# Patient Record
Sex: FEMALE | Race: WHITE | NOT HISPANIC OR LATINO | Employment: UNEMPLOYED | URBAN - METROPOLITAN AREA
[De-identification: names, ages, dates, MRNs, and addresses within clinical notes are randomized per-mention and may not be internally consistent; named-entity substitution may affect disease eponyms.]

---

## 2019-05-06 PROCEDURE — 86481 TB AG RESPONSE T-CELL SUSP: CPT

## 2019-05-07 ENCOUNTER — LAB REQUISITION (OUTPATIENT)
Dept: LAB | Facility: HOSPITAL | Age: 54
End: 2019-05-07

## 2019-05-07 DIAGNOSIS — Z00.00 ROUTINE GENERAL MEDICAL EXAMINATION AT A HEALTH CARE FACILITY: ICD-10-CM

## 2019-05-08 LAB
TSPOT INTERPRETATION: NEGATIVE
TSPOT NIL CONTROL INTERPRETATION: NORMAL
TSPOT PANEL A: 0
TSPOT PANEL B: 0
TSPOT POS CONTROL INTERPRETATION: NORMAL

## 2021-03-19 ENCOUNTER — TRANSCRIBE ORDERS (OUTPATIENT)
Dept: ADMINISTRATIVE | Facility: HOSPITAL | Age: 56
End: 2021-03-19

## 2021-03-19 DIAGNOSIS — Z01.818 PRE-OPERATIVE CLEARANCE: Primary | ICD-10-CM

## 2021-03-22 ENCOUNTER — LAB (OUTPATIENT)
Dept: LAB | Facility: HOSPITAL | Age: 56
End: 2021-03-22

## 2021-03-22 DIAGNOSIS — Z01.818 PRE-OPERATIVE CLEARANCE: ICD-10-CM

## 2021-03-22 PROCEDURE — U0004 COV-19 TEST NON-CDC HGH THRU: HCPCS | Performed by: SURGERY

## 2021-03-22 PROCEDURE — C9803 HOPD COVID-19 SPEC COLLECT: HCPCS

## 2021-03-23 LAB — SARS-COV-2 RNA NOSE QL NAA+PROBE: NOT DETECTED

## 2021-04-02 ENCOUNTER — TRANSCRIBE ORDERS (OUTPATIENT)
Dept: ADMINISTRATIVE | Facility: HOSPITAL | Age: 56
End: 2021-04-02

## 2021-04-02 DIAGNOSIS — Z01.818 OTHER SPECIFIED PRE-OPERATIVE EXAMINATION: Primary | ICD-10-CM

## 2021-04-05 ENCOUNTER — LAB (OUTPATIENT)
Dept: LAB | Facility: HOSPITAL | Age: 56
End: 2021-04-05

## 2021-04-05 DIAGNOSIS — Z01.818 OTHER SPECIFIED PRE-OPERATIVE EXAMINATION: ICD-10-CM

## 2021-04-05 PROCEDURE — C9803 HOPD COVID-19 SPEC COLLECT: HCPCS

## 2021-04-05 PROCEDURE — U0004 COV-19 TEST NON-CDC HGH THRU: HCPCS | Performed by: SURGERY

## 2021-04-06 LAB — SARS-COV-2 RNA NOSE QL NAA+PROBE: NOT DETECTED

## 2022-01-12 ENCOUNTER — CONSULT (OUTPATIENT)
Dept: RADIATION ONCOLOGY | Facility: HOSPITAL | Age: 57
End: 2022-01-12

## 2022-01-12 ENCOUNTER — OFFICE VISIT (OUTPATIENT)
Dept: RADIATION ONCOLOGY | Facility: HOSPITAL | Age: 57
End: 2022-01-12

## 2022-01-12 DIAGNOSIS — C79.31 BRAIN METASTASIS: Primary | ICD-10-CM

## 2022-01-12 PROCEDURE — 99204 OFFICE O/P NEW MOD 45 MIN: CPT | Performed by: RADIOLOGY

## 2022-01-12 PROCEDURE — G0463 HOSPITAL OUTPT CLINIC VISIT: HCPCS

## 2022-01-12 NOTE — PROGRESS NOTES
New Patient Office Consult      Patient Name: Maria Del Rosario Carrasco  : 1965   MRN: [id number]     Requesting Physician: Gary Johnson MD    Chief Complaint:  Brain Metastasis   Staging: IV    History of Present Illness: Maria Del Rosario Carrasco is a pleasant 56 y.o. female who is here today for consultation regarding radiation therapy to the whole brain.  She is accompanied today by her son.    Ms. Carrasco originally had presented with abdominal pain.  In 2021 a CT of the abdomen pelvis revealed unusual multiloculated lesions in the left lobe of the liver, with multiple lung nodules.  Her PET CT on 3/22/2021 revealed bilateral hypermetabolic pulmonary nodules which increased in size from the previous exam.  There was a 15 mm left upper lobe nodule, right lower lobe nodule measuring 14 mm, intense hypermetabolism in the lateral segment of the left lobe of the liver with a liver mass, and hypermetabolism in the medial segment of the left lobe of the liver.  After this on 3/30/2021 the patient underwent ultrasound-guided liver biopsy which pathology revealed to be assistant with adenocarcinoma CK7 positive TTF-1 negative suggestive of cholangiocarcinoma, pancreatic versus upper GI.  Imaging after this date continued to have progression of disease.  However, an MRI on 2022 revealed interval development of at least 3 foci of abnormal enhancement worrisome for metastatic disease, the ring-enhancing lesion in the left axillary occipital lobe measured 7 mm, anterior margin of the right frontal lobe measured 5 mm, and another focus in the medial left temporal lobe with multiple other small foci of enhancement.  Previous MRIs up to this point had not shown any worrisome areas.    She is currently been seeing Dr. Johnson at [facility] for her cancer care up to this point.  So far she has underwent cisplatin from 5/3/2021, and was discontinued on 2021.  She received FOLFOX from 2021, and was discontinued  on 9/29/2021 due to disease progression.  She had also received FOLFIRI from 11/4/2021, and this was discontinued on 1/3/2022.    Today she has no neurological complaints.  She states that she has occasional headaches, but other than that no memory loss, syncope, seizures, or confusion.  She does have weakness and abdominal distention.  She uses a wheelchair, but is able to walk short distances.      Subjective      Review of Systems:   Review of Systems   HENT: Negative.    Eyes: Negative.    Respiratory: Negative.    Cardiovascular: Negative.    Gastrointestinal: Positive for abdominal distention.   Endocrine: Negative.    Genitourinary: Negative.    Musculoskeletal: Negative.    Skin: Negative.  Negative for color change and dry skin.   Neurological: Positive for weakness and headache. Negative for syncope, memory problem and confusion.   Hematological: Negative.    Psychiatric/Behavioral: Negative.      Review of Systems   HENT:  Negative.    Eyes: Negative.    Respiratory: Negative.    Cardiovascular: Negative.    Gastrointestinal: Positive for abdominal distention.   Endocrine: Negative.    Genitourinary: Negative.     Musculoskeletal: Negative.    Skin: Negative.  Negative for color change.   Neurological: Positive for weakness. Negative for syncope.   Hematological: Negative.    Psychiatric/Behavioral: Negative.  Negative for confusion.       I have reviewed and confirmed the accuracy of the ROS as documented by the MA/LPN/RN BEBO Funez     Past Oncology History:   Oncology/Hematology History    No history exists.        Past Radiation History:  No previous exposures to ionizing radiation therapy and there are not relative contraindications including connective tissue disorder.     Past Medical History:   Past Medical History:   Diagnosis Date   • Diabetes mellitus (HCC)    • Diverticulitis    • Fractures    • Pancreatic cancer (HCC)        Past Surgical History: History reviewed. No  pertinent surgical history.    Family History:   Family History   Problem Relation Age of Onset   • Cancer Father    • Cancer Paternal Uncle        Social History:   Social History     Socioeconomic History   • Marital status:    Tobacco Use   • Smoking status: Never Smoker   • Smokeless tobacco: Never Used   Substance and Sexual Activity   • Alcohol use: Never   • Drug use: Never       Medications:   No current outpatient medications on file.    Allergies:   Allergies   Allergen Reactions   • Codeine Rash   • Keflex [Cephalexin] Rash   • Wellbutrin [Bupropion] Rash       KPS: 50%     Results Review:   The following data was reviewed by: BEBO Funez on 01/12/2022:    Imaging:    PET/CT-3/22/2021          MRI brain with and without contrast-1/4/2022        Pathology:    3/3/2021        Objective     Physical Exam:  Physical Exam  Vitals reviewed.   Constitutional:       General: She is not in acute distress.     Appearance: Normal appearance. She is normal weight. She is not ill-appearing.   HENT:      Head: Normocephalic.      Nose: Nose normal.      Mouth/Throat:      Mouth: Mucous membranes are moist.      Pharynx: Oropharynx is clear.   Eyes:      Extraocular Movements: Extraocular movements intact.      Conjunctiva/sclera: Conjunctivae normal.      Pupils: Pupils are equal, round, and reactive to light.   Cardiovascular:      Rate and Rhythm: Normal rate and regular rhythm.      Pulses: Normal pulses.      Heart sounds: Normal heart sounds.   Pulmonary:      Effort: Pulmonary effort is normal. No respiratory distress.      Breath sounds: Normal breath sounds.   Abdominal:      General: Bowel sounds are normal. There is distension.      Palpations: Abdomen is soft. There is no mass.   Musculoskeletal:         General: No swelling or tenderness. Normal range of motion.      Cervical back: Normal range of motion.   Skin:     General: Skin is warm and dry.      Capillary Refill: Capillary  refill takes less than 2 seconds.   Neurological:      General: No focal deficit present.      Mental Status: She is alert and oriented to person, place, and time. Mental status is at baseline.   Psychiatric:         Mood and Affect: Mood normal.         Behavior: Behavior normal.         Thought Content: Thought content normal.         Judgment: Judgment normal.         Vital Signs: There were no vitals filed for this visit.  There is no height or weight on file to calculate BMI.       Assessment / Plan      Assessment/Plan:   Ms. 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    She had a CT scan on December 26, 2021 reveals and reports some pulm metastatic disease around T4 level.    She is about to start palliative radiation therapy to T3-T5 with a 30 Gray in 10 fractions as planned by Dr. Diez at [facility].    MRI of the brain on January 4, 2022 showed a ring-enhancing lesion in the left occipital lobe measuring 7 mm. There is a 5 mm focus of enhancement along the anterior margin of the right frontal lobe.  There is a tiny focus of enhancement in the medial left temporal lobe.  Interval development of at least 3 foci of abnormal enhancement worrisome for metastatic disease in the brain. Patient is not having any neurological symptoms yet.    I have recommended SRS to the 3 lesions in the brain with [facility] at [facility], due to the possible toxicity from the whole brain radiation therapy.    Patient is willing to travel to [place] (her son lives there) and we will make corresponding arrangements for her to be seen at [facility].    I, Joe Renteria MD, personally performed the services described in this documentation as scribed by the above named individual in my presence, and it is both accurate and complete.  1/12/2022  10:12 EST     Electronically signed by Joe Renteria MD, 01/12/22,  10:12 AM EST.    Follow Up:   None at this time     Scribed for Dr. Joe Renteria MD by Cleopatra Ramírez, APRN 1/12/2022  09:51 EST

## 2022-01-12 NOTE — PROGRESS NOTES
This is a 56-year-old lady who was diagnosed in April 2021 with a metastatic stage IV adenocarcinoma of pancreatic or  hepatobiliary origin.    She has a CT scan on December 26, 2021 reveals and reports some pulm metastatic disease around T4 level with    She is about to start palliative radiation therapy to T3-T5 with a 30 Gray in 10 fractions as planned by Dr. Diez at [facility].    MRI of the brain on January 4, 2022 showed a ring-enhancing lesion in the left occipital lobe measuring 7 mm    There is a 5 mm focus of enhancement along the anterior margin of the right frontal lobe.  There is a tiny focus of enhancement in the medial left temporal lobe.  Interval development of at least 3 foci of abnormal enhancement worrisome for metastatic disease in the brain    Patient is not having any neurological symptoms yet    I have recommended SRS to the 3 lesions in the brain with [facility] at [facility].  Her toxicity from the whole brain radiation therapy.    Patient is willing to travel to [place] and we had made corresponding arrangements for her to be seen at [facility].

## 2022-01-18 ENCOUNTER — TELEPHONE (OUTPATIENT)
Dept: RADIATION ONCOLOGY | Facility: HOSPITAL | Age: 57
End: 2022-01-18

## 2022-01-18 NOTE — TELEPHONE ENCOUNTER
Radiation Oncology appointment and outside film reminder-Attemted to call the patient, there was no answer, I left a voice mail with the appointment details and a reminder on the importance of bringing outside films to consult-AAKASH Joyner

## 2022-01-20 ENCOUNTER — HOSPITAL ENCOUNTER (OUTPATIENT)
Dept: RADIATION ONCOLOGY | Facility: HOSPITAL | Age: 57
Setting detail: RADIATION/ONCOLOGY SERIES
Discharge: HOME | End: 2022-01-20